# Patient Record
Sex: FEMALE | Race: BLACK OR AFRICAN AMERICAN | NOT HISPANIC OR LATINO | URBAN - METROPOLITAN AREA
[De-identification: names, ages, dates, MRNs, and addresses within clinical notes are randomized per-mention and may not be internally consistent; named-entity substitution may affect disease eponyms.]

---

## 2022-04-30 ENCOUNTER — TELEPHONE ENCOUNTER (OUTPATIENT)
Dept: URBAN - METROPOLITAN AREA CLINIC 121 | Facility: CLINIC | Age: 42
End: 2022-04-30

## 2022-05-01 ENCOUNTER — TELEPHONE ENCOUNTER (OUTPATIENT)
Dept: URBAN - METROPOLITAN AREA CLINIC 121 | Facility: CLINIC | Age: 42
End: 2022-05-01

## 2023-11-30 ENCOUNTER — TELEPHONE ENCOUNTER (OUTPATIENT)
Dept: URBAN - METROPOLITAN AREA CLINIC 84 | Facility: CLINIC | Age: 43
End: 2023-11-30

## 2023-12-06 ENCOUNTER — OFFICE VISIT (OUTPATIENT)
Dept: URBAN - METROPOLITAN AREA CLINIC 84 | Facility: CLINIC | Age: 43
End: 2023-12-06

## 2023-12-06 ENCOUNTER — OFFICE VISIT (OUTPATIENT)
Dept: URBAN - METROPOLITAN AREA CLINIC 118 | Facility: CLINIC | Age: 43
End: 2023-12-06
Payer: COMMERCIAL

## 2023-12-06 ENCOUNTER — LAB OUTSIDE AN ENCOUNTER (OUTPATIENT)
Dept: URBAN - METROPOLITAN AREA CLINIC 118 | Facility: CLINIC | Age: 43
End: 2023-12-06

## 2023-12-06 ENCOUNTER — DASHBOARD ENCOUNTERS (OUTPATIENT)
Age: 43
End: 2023-12-06

## 2023-12-06 VITALS
SYSTOLIC BLOOD PRESSURE: 185 MMHG | WEIGHT: 195.6 LBS | BODY MASS INDEX: 36.93 KG/M2 | HEART RATE: 80 BPM | HEIGHT: 61 IN | DIASTOLIC BLOOD PRESSURE: 99 MMHG | TEMPERATURE: 97.5 F

## 2023-12-06 DIAGNOSIS — R10.33 PERIUMBILICAL ABDOMINAL PAIN: ICD-10-CM

## 2023-12-06 DIAGNOSIS — R19.05 PERIUMBILICAL MASS: ICD-10-CM

## 2023-12-06 PROBLEM — 443503005: Status: ACTIVE | Noted: 2023-12-06

## 2023-12-06 PROCEDURE — 99204 OFFICE O/P NEW MOD 45 MIN: CPT | Performed by: INTERNAL MEDICINE

## 2023-12-06 RX ORDER — FLUTICASONE PROPIONATE AND SALMETEROL 50; 250 UG/1; UG/1
INHALE 1 PUFF INTO THE LUNGS TWICE A DAY FOR 30 DAYS POWDER RESPIRATORY (INHALATION)
Qty: 60 EACH | Refills: 3 | Status: ACTIVE | COMMUNITY

## 2023-12-06 RX ORDER — IBUPROFEN 800 MG/1
TABLET, FILM COATED ORAL
Qty: 60 TABLET | Status: ACTIVE | COMMUNITY

## 2023-12-06 RX ORDER — MONTELUKAST SODIUM 10 MG/1
TABLET, FILM COATED ORAL
Qty: 30 TABLET | Status: ACTIVE | COMMUNITY

## 2023-12-06 RX ORDER — ALBUTEROL SULFATE 2 MG/5ML
SYRUP ORAL
Qty: 150 UNSPECIFIED | Status: ACTIVE | COMMUNITY

## 2023-12-06 RX ORDER — LORATADINE 10 MG/1
TABLET ORAL
Qty: 90 TABLET | Status: ACTIVE | COMMUNITY

## 2023-12-06 RX ORDER — FERROUS SULFATE TAB 325 MG (65 MG ELEMENTAL FE) 325 (65 FE) MG
TAKE 1 TABLET BY MOUTH EVERY DAY TAB ORAL
Qty: 90 EACH | Refills: 0 | Status: ACTIVE | COMMUNITY

## 2023-12-06 NOTE — PHYSICAL EXAM HENT:
Head: normocephalic, atraumatic. Face: within normal limits, no involuntary movements and expected facial expressions. Ears: external ears within normal limits

## 2023-12-06 NOTE — PHYSICAL EXAM NECK/THYROID:
Normal appearance, nontender to palpation, no deformities, trachea midline, thyroid normal size, no palpable nodules or masses, no lymphadenopathy

## 2023-12-06 NOTE — HPI-TODAY'S VISIT:
Pt is a 42yo with PMHX of HTN and asthma here for evaluation. States she noticed a mass on her mid-abdomen 2 months ago with associated abdominal pain. Aggravating factors include eating, being on her menstrual cycle, coughing, straining, and applying any pressure on it. No particular trigger foods. Reports increasing in size more in the last 3 weeks. 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 Describes the pain as intermittent "spasming cramp" 1-2 times/week that lasts ~20mins and resolves spontaneously. No radiation of pain. Pain improves with muscle relaxer Cyclobenzaprine - taken twice. Reports eating smaller meals due to the pain. Never had anything like this in the past. No previous abdominal surgeries. Denies changes in bowel habits or constipation. Reports hx of associated diarrhea postprandially only when she is on her menstrual cycle for the last year. Also c/o early satiety and increased belching and flatulence. No N/V, fever/chills, blood in stool, melena, or unintentional weight loss. Reports 3 episodes of emesis last month.    No FHX colon cancer or gastric cancer. No UGI sxs. No previous workup for sxs. Never had EGD or colonoscopy.

## 2023-12-06 NOTE — PHYSICAL EXAM CHEST:
Thorax symmetric, no deformities or lesions, no traumatic injuries, no masses present, breathing unlabored without accessory muscle use, normal breath sounds bilaterally

## 2023-12-06 NOTE — PHYSICAL EXAM GASTROINTESTINAL
Abdomen: soft and nondistended, no visible scars, large non-erythematous periumbilical mass extremely tender to palpation, not warm to touch, irreducible, no guarding or rigidity, no rebound tenderness, normal bowel sounds. Liver and Spleen: no hepatosplenomegaly, liver nontender. Rectal: Deferred.

## 2023-12-11 ENCOUNTER — TELEPHONE ENCOUNTER (OUTPATIENT)
Dept: URBAN - METROPOLITAN AREA CLINIC 23 | Facility: CLINIC | Age: 43
End: 2023-12-11

## 2023-12-18 ENCOUNTER — TELEPHONE ENCOUNTER (OUTPATIENT)
Dept: URBAN - METROPOLITAN AREA CLINIC 23 | Facility: CLINIC | Age: 43
End: 2023-12-18

## 2023-12-18 ENCOUNTER — OFFICE VISIT (OUTPATIENT)
Dept: URBAN - METROPOLITAN AREA CLINIC 84 | Facility: CLINIC | Age: 43
End: 2023-12-18

## 2023-12-19 ENCOUNTER — TELEPHONE ENCOUNTER (OUTPATIENT)
Dept: URBAN - METROPOLITAN AREA CLINIC 118 | Facility: CLINIC | Age: 43
End: 2023-12-19

## 2023-12-21 ENCOUNTER — TELEPHONE ENCOUNTER (OUTPATIENT)
Dept: URBAN - METROPOLITAN AREA CLINIC 118 | Facility: CLINIC | Age: 43
End: 2023-12-21

## 2023-12-26 ENCOUNTER — TELEPHONE ENCOUNTER (OUTPATIENT)
Dept: URBAN - METROPOLITAN AREA CLINIC 118 | Facility: CLINIC | Age: 43
End: 2023-12-26